# Patient Record
Sex: FEMALE | Race: WHITE | Employment: FULL TIME | URBAN - METROPOLITAN AREA
[De-identification: names, ages, dates, MRNs, and addresses within clinical notes are randomized per-mention and may not be internally consistent; named-entity substitution may affect disease eponyms.]

---

## 2021-04-26 ENCOUNTER — HOSPITAL ENCOUNTER (EMERGENCY)
Age: 26
Discharge: HOME | End: 2021-04-26
Attending: EMERGENCY MEDICINE
Payer: COMMERCIAL

## 2021-04-26 VITALS
WEIGHT: 225 LBS | DIASTOLIC BLOOD PRESSURE: 89 MMHG | BODY MASS INDEX: 35.31 KG/M2 | RESPIRATION RATE: 16 BRPM | HEART RATE: 85 BPM | SYSTOLIC BLOOD PRESSURE: 142 MMHG | OXYGEN SATURATION: 100 % | HEIGHT: 67 IN | TEMPERATURE: 97.3 F

## 2021-04-26 DIAGNOSIS — S39.012A STRAIN OF LUMBAR REGION, INITIAL ENCOUNTER: ICD-10-CM

## 2021-04-26 DIAGNOSIS — M54.50 ACUTE LEFT-SIDED LOW BACK PAIN WITHOUT SCIATICA: Primary | ICD-10-CM

## 2021-04-26 PROCEDURE — 99284 EMERGENCY DEPT VISIT MOD MDM: CPT

## 2021-04-26 PROCEDURE — 6370000000 HC RX 637 (ALT 250 FOR IP): Performed by: EMERGENCY MEDICINE

## 2021-04-26 RX ORDER — METHOCARBAMOL 500 MG/1
500 TABLET, FILM COATED ORAL 3 TIMES DAILY
Qty: 9 TABLET | Refills: 0 | Status: SHIPPED | OUTPATIENT
Start: 2021-04-26 | End: 2021-04-29

## 2021-04-26 RX ORDER — METHOCARBAMOL 500 MG/1
500 TABLET, FILM COATED ORAL ONCE
Status: COMPLETED | OUTPATIENT
Start: 2021-04-26 | End: 2021-04-26

## 2021-04-26 RX ORDER — LIDOCAINE 4 G/G
1 PATCH TOPICAL DAILY
Status: DISCONTINUED | OUTPATIENT
Start: 2021-04-26 | End: 2021-04-26 | Stop reason: HOSPADM

## 2021-04-26 RX ADMIN — METHOCARBAMOL 500 MG: 500 TABLET ORAL at 16:29

## 2021-04-26 ASSESSMENT — PAIN DESCRIPTION - DESCRIPTORS: DESCRIPTORS: CONSTANT

## 2021-04-26 ASSESSMENT — PAIN SCALES - GENERAL: PAINLEVEL_OUTOF10: 10

## 2021-04-26 ASSESSMENT — PAIN DESCRIPTION - PAIN TYPE: TYPE: ACUTE PAIN

## 2021-04-26 ASSESSMENT — PAIN DESCRIPTION - DIRECTION: RADIATING_TOWARDS: LEFT LEG

## 2021-04-26 ASSESSMENT — PAIN DESCRIPTION - LOCATION: LOCATION: BACK

## 2021-04-26 NOTE — ED NOTES
Pt reports left lower back pain for a week and states it radiates down her left leg      Jerry Castellanos RN  04/26/21 2019

## 2021-04-26 NOTE — ED PROVIDER NOTES
Emergency Department Encounter    Patient: Loc Yost  MRN: [id number]  : 1995  Date of Evaluation: 2021  ED Provider:  Garry Ramirez      Triage Chief Complaint:   Back Pain (lower left side radiates down leg)      [place]:  Loc Yost is a 31 y.o. female that presents to the emergency department for evaluation of left low back pain. Patient describes dull, achy pain localized to the left low back. Denies any radiating pain. It is positional in nature as symptoms are exacerbated movement and forward bending. Denies loss of bowel or bladder function. Denies urinary retention. Denies saddle anesthesia. Patient denies history of IV drug use. Denies any personal history of cancer. No recent steroid use. No weight loss or history of malignancy. Denies difficulty ambulating or ataxia. Denies difficulty bearing weight on the lower extremities. Denies abdominal pain, nausea, vomiting, diarrhea, constipation, hematochezia, melena. Denies dysuria or hematuria. Denies fevers, chills, diaphoresis, night sweats. Denies syncope, dizziness, lightheadedness, numbness, tingling, weakness, paresthesias, focal deficits. Denies additional precipitating, modifying, alleviating factors. ROS - see HPI, below listed is current ROS at time of my eval:  A complete 14 point review of systems was performed and is as dictated above, otherwise negative. History reviewed. No pertinent past medical history. History reviewed. No pertinent surgical history. History reviewed. No pertinent family history.     Social History     Socioeconomic History    Marital status: Single     Spouse name: Not on file    Number of children: Not on file    Years of education: Not on file    Highest education level: Not on file   Occupational History    Not on file   Social Needs    Financial resource strain: Not on file    Food insecurity     Worry: Not on file     Inability: Not on file   BoardEvals needs Medical: Not on file     Non-medical: Not on file   Tobacco Use    Smoking status: Never Smoker   Substance and Sexual Activity    Alcohol use: Never    Drug use: Never    Sexual activity: Not on file   Lifestyle    Physical activity     Days per week: Not on file     Minutes per session: Not on file    Stress: Not on file   Relationships    Social connections     Talks on phone: Not on file     Gets together: Not on file     Attends Evangelical service: Not on file     Active member of club or organization: Not on file     Attends meetings of clubs or organizations: Not on file     Relationship status: Not on file    Intimate partner violence     Fear of current or ex partner: Not on file     Emotionally abused: Not on file     Physically abused: Not on file     Forced sexual activity: Not on file   Other Topics Concern    Not on file   Social History Narrative    Not on file       Current Facility-Administered Medications   Medication Dose Route Frequency Provider Last Rate Last Admin    lidocaine 4 % external patch 1 patch  1 patch Transdermal Daily Raymond Anne, DO        methocarbamol (ROBAXIN) tablet 500 mg  500 mg Oral Once Raymond Anne, DO         Current Outpatient Medications   Medication Sig Dispense Refill    methocarbamol (ROBAXIN) 500 MG tablet Take 1 tablet by mouth 3 times daily for 3 days 9 tablet 0    Norgestimate-Eth Estradiol (SPRINTEC 28 PO) Take by mouth         Allergies   Allergen Reactions    Augmentin [Amoxicillin-Pot Clavulanate] Nausea And Vomiting       Nursing Notes Reviewed    Physical Exam:  Triage VS:    ED Triage Vitals   Enc Vitals Group      BP  142/89      Pulse  85      Resp  16      Temp  97.3 °F      Temp src  oral      SpO2  100% room air       My pulse ox interpretation is -WNL    General appearance: Patient is well-developed and well-nourished. Appears in no apparent distress. Is awake, alert, oriented. Nontoxic in appearance. Skin:  Warm. Dry. Intact. No rash, petechiae, purpura. No herpes zoster lesions. Eye: Pupils are round and reactive. No conjunctival redness or drainage. No scleral icterus. Head, ears, nose, mouth and throat: Head is normocephalic and atraumatic. There are no external masses or lesions. No nasal drainage. Airway is patent. Neck: Supple. No meningeal signs. Trachea is midline. No JVD. No cervical spine tenderness to palpation. Heart: Regular rate and regular. Audible S1 and S2. No audible murmurs, rubs, gallops. Perfusion: Symmetric peripheral pulses. No peripheral edema. Respiratory:   Lungs clear to auscultation bilaterally. No rales, rhonchi, wheezes. No retractions or accessory muscle use. Abdominal: Soft. Nontender. Nondistended. No peritoneal signs. Bowel sounds are auscultated in all 4 quadrants. No midline pulsatile abdominal masses, thrills, bruits. Extremities: No clubbing, cyanosis, or edema. No joint swelling. Normal tone. Patient is able to move bilateral upper and lower extremities with 5/5 muscle strength. Ambulates without ataxia or gait instability. Back:  No CVA tenderness to palpation. No midline tenderness to palpation at the midline in the cervical spine, thoracic spine, lumbar spine. There is paraspinal muscle tenderness at the left of the lumbar spine. No step-offs or deformities. No clinical signs of cauda equina syndrome or cord compression. Neurological:  Alert and oriented times 3. No focal or lateralizing neurologic deficits. Sensations grossly intact to light touch and two-point discrimination in the L3-S1 dermatomal distribution of bilateral lower extremities. 2/4 patellar and Achilles deep tendon reflexes. Negative straight leg raise bilaterally. Vascular: 2/4 femoral, DP, PT pulses in the lower extremities. Brisk capillary refill. Compartments are soft and compressible  Psychiatric: Cooperative.        MDM:  Patient was seen and evaluated in the emergency department by myself. A thorough history and physical exam were performed, prior medical records were reviewed. Upon arrival to the emergency department, patient's vital signs were noted. Patient is hemodynamically stable. Differential diagnoses and treatment plan were discussed with the patient. Robaxin and Lidoderm patch was provided for pain. On repeat evaluations, patient remains hemodynamically stable. Patient notes slight improvement in symptoms with therapy provided in the emergency department. Patient presents with left low back pain. Repeat neurovascular exams remained stable. There are no focal motor or sensory deficits. Low clinical suspicion for major or malignant pathology such as, but not limited to, cauda equina syndrome, acute spinal cord pathology, spinal epidural abscess, spinal cord compression. The evidence indicates that the patient is very low risk for an acute spinal emergency and this is consistent with my clinical intuition. There are no red flag signs or symptoms to warrant emergent MRI. I believe the patient is a good candidate for outpatient symptomatic treatment. The patient was instructed on this treatment and the course that this type of back pain typically follows. I also discussed worrisome symptoms to monitor for at home including, but not limited to, numbness or weakness in the lower extremities, bowel or bladder dysfunction, and numbness in the groin. Patient instructed to follow-up with primary care physician for reevaluation. Instructed that if symptoms persist or worsen, they will require MRI of the back for further evaluation. Instructed to return to the emergency department immediately with any new, worsening, concerning symptoms. Prescription for Robaxin was provided. Side effect profile of this medication was discussed. Additional verbal and printed discharge instructions provided. Patient expressed understanding and was agreeable with discharge plan.   Patient was discharged in stable, ambulatory condition. Clinical Impression:  1. Acute left-sided low back pain without sciatica    2. Strain of lumbar region, initial encounter        Disposition referral:  [address]  [phone]  Go to   Immediately with any new, worsening, concerning symptoms. [facility]  [address] [facility]  [phone]  In 3 days  Please follow-up with primary care physician for reevaluation and to establish care. Symptoms persist or worsen, you require imaging including MRI. Disposition medications:  New Prescriptions    METHOCARBAMOL (ROBAXIN) 500 MG TABLET    Take 1 tablet by mouth 3 times daily for 3 days       ED Provider Disposition:  DISPOSITION Decision To Discharge 04/26/2021 04:17:39 PM        Comment: Please note this report has been produced using speech recognition software and may contain errors related to that system including errors in grammar, punctuation, and spelling, as well as words and phrases that may be inappropriate. Efforts were made to edit the dictations.        [address] [facility],   04/26/21 0008